# Patient Record
(demographics unavailable — no encounter records)

---

## 2024-11-25 NOTE — CURRENT MEDS
[Adherentt to medications as prescribed] : the patient is adherent to medications as prescribed [Adherent to medications] : Patient is adherent to medications as prescribed [Medication and Allergies Reconciled] : medication and allergies reconciled [High Risk Medications Reviewed and Reconciled (Beers Criteria)] : high risk medications reviewed, reconciled [Reviewed patient reported medication adherence from Comprehensive Assessment] : reviewed patient reported medication adherence from comprehensive assessment

## 2024-11-25 NOTE — HEALTH RISK ASSESSMENT
[HRA Reviewed] : Health risk assessment reviewed [Independent] : managing finances [Some assistance needed] : using transportation [No falls in past year] : Patient reported no falls in the past year [No] : The patient does not have visual impairment [TimeGetUpGo] : 0 [de-identified] : did not perform, was sitting in bed [FreeTextEntry8] : use rollator outside [[facility]] : 0 [FreeTextEntry2] : did not perform, was sitting in bed

## 2024-11-25 NOTE — ADDENDUM
[FreeTextEntry1] :   Fauzia CORDERO assisted in documentation on 11/25/2024 acting as a scribe for JOANNE Kilgore.

## 2024-11-25 NOTE — CHRONIC CARE ASSESSMENT
[3 or More Times Per Week] : exercises 3 or more times per week [General Adherence] : and is generally adherent [PPS Score: ____] : Palliative Performance Scale (PPS) Score: [unfilled] [Current] : Current Pain: [Oriented To Person] : ~L oriented to person [Oriented To Place] : ~L oriented to place [Oriented To Time] : ~L oriented to time [Oriented To Situation] : ~L oriented to situation [Alert] : ~L alert [No Action Needed] : No action needed [Reviewed depression screen from comprehensive assessment] : Reviewed depression screen from comprehensive assessment [Reviewed Home Safety Evaluation] : Reviewed home safety evaluation [Walking] : walking [Diabetic Diet] : diabetic [Low Fat Diet] : low fat [Low Carb Diet] : low carbohydrate [Low Salt Diet] : low salt [de-identified] : Low sodium, low fat, no concentrated sweets, diabetic, low carb, renal diet [FreeTextEntry1] : renal diet [Disorientation] : no disorientation was observed [Non-responsive] : responsiveness  [Lethargic] : not lethargic [Over the Past 2 Weeks, Have You Felt Down, Depressed, or Hopeless?] : 1.) Over the past 2 weeks, have you felt down, depressed, or hopeless? No [Over the Past 2 Weeks, Have You Felt Little Interest or Pleasure Doing Things?] : 2.) Over the past 2 weeks, have you felt little interest or pleasure doing things? No [Reports changes in hearing] : reports no changes in hearing [Reports changes in vision] : Reports no changes in vision [de-identified] : 0/10 [de-identified] : Extensive cardiac history (A-fib, CHFpEF, PPM, Bradycardia, cardiac ablation), CKD stage 3, DM2, liver cirrhosis, Right Breast Cancer, Osteoarthritis, [de-identified] : walks daily [de-identified] : Low sodium, low fat, no concentrated sweets, diabetic, low carb, renal diet [de-identified] : renal diet

## 2024-11-25 NOTE — PHYSICAL EXAM
[Normal S1, S2] : normal S1 and S2 [No Acute Distress] : no acute distress [Normal Voice/Communication] : normal voice communication [Normal Sclera/Conjunctiva] : normal sclera/conjunctiva [EOMI] : extra ocular movement intact [Normal Outer Ear/Nose] : the ears and nose were normal in appearance [Normal TMs] : both tympanic membranes were normal [No JVD] : no jugular venous distention [No LAD] : no lymphadenopathy [No Respiratory Distress] : no respiratory distress [Clear to Auscultation] : lungs were clear to auscultation bilaterally [No Accessory Muscle Use] : no accessory muscle use [Normal Rate] : heart rate was normal  [No Murmurs] : no murmurs heard [No Edema] : there was no peripheral edema [Normal Bowel Sounds] : normal bowel sounds [Non Tender] : non-tender [Soft] : abdomen soft [Not Distended] : not distended [Normal Post Cervical Nodes] : no posterior cervical lymphadenopathy [Normal Anterior Cervical Nodes] : no anterior cervical lymphadenopathy [Scoliosis] : scoliosis not present [No Joint Swelling] : no joint swelling seen [No Rash] : no rash [No Skin Lesions] : no skin lesions [Cranial Nerves Intact] : cranial nerves 2-12 were intact [Oriented x3] : oriented to person, place, and time [Over the Past 2 Weeks, Have You Felt Down, Depressed, or Hopeless?] : 1.) Over the past 2 weeks, have you felt down, depressed, or hopeless? No [Over the Past 2 Weeks, Have You Felt Little Interest or Pleasure Doing Things?] : 2.) Over the past 2 weeks, have you felt little interest or pleasure doing things? No [Foot Ulcers] : no foot ulcers [de-identified] : obese

## 2024-11-25 NOTE — CHRONIC CARE ASSESSMENT
[3 or More Times Per Week] : exercises 3 or more times per week [General Adherence] : and is generally adherent [PPS Score: ____] : Palliative Performance Scale (PPS) Score: [unfilled] [Current] : Current Pain: [Oriented To Person] : ~L oriented to person [Oriented To Place] : ~L oriented to place [Oriented To Time] : ~L oriented to time [Oriented To Situation] : ~L oriented to situation [Alert] : ~L alert [No Action Needed] : No action needed [Reviewed depression screen from comprehensive assessment] : Reviewed depression screen from comprehensive assessment [Reviewed Home Safety Evaluation] : Reviewed home safety evaluation [Walking] : walking [Diabetic Diet] : diabetic [Low Fat Diet] : low fat [Low Carb Diet] : low carbohydrate [Low Salt Diet] : low salt [de-identified] : Low sodium, low fat, no concentrated sweets, diabetic, low carb, renal diet [FreeTextEntry1] : renal diet [Disorientation] : no disorientation was observed [Non-responsive] : responsiveness  [Lethargic] : not lethargic [Over the Past 2 Weeks, Have You Felt Down, Depressed, or Hopeless?] : 1.) Over the past 2 weeks, have you felt down, depressed, or hopeless? No [Over the Past 2 Weeks, Have You Felt Little Interest or Pleasure Doing Things?] : 2.) Over the past 2 weeks, have you felt little interest or pleasure doing things? No [Reports changes in hearing] : reports no changes in hearing [Reports changes in vision] : Reports no changes in vision [de-identified] : 0/10 [de-identified] : Extensive cardiac history (A-fib, CHFpEF, PPM, Bradycardia, cardiac ablation), CKD stage 3, DM2, liver cirrhosis, Right Breast Cancer, Osteoarthritis, [de-identified] : walks daily [de-identified] : Low sodium, low fat, no concentrated sweets, diabetic, low carb, renal diet [de-identified] : renal diet

## 2024-11-25 NOTE — REVIEW OF SYSTEMS
[Joint Pain] : joint pain [Joint Stiffness] : no joint stiffness [Joint Swelling] : no joint swelling [Muscle Weakness] : no muscle weakness [Muscle Pain] : no muscle pain [Back Pain] : back pain [Headache] : no headache [Dizziness] : no dizziness [Fainting] : no fainting [Confusion] : no confusion [Memory Loss] : no memory loss [Unsteady Walking] : ataxia [Suicidal] : not suicidal [Insomnia] : insomnia [Anxiety] : no anxiety [Depression] : no depression [Negative] : Heme/Lymph [FreeTextEntry9] : lower back pain and right knee pain [de-identified] : chronic insomnia

## 2024-11-25 NOTE — HEALTH RISK ASSESSMENT
[HRA Reviewed] : Health risk assessment reviewed [Independent] : managing finances [Some assistance needed] : using transportation [No falls in past year] : Patient reported no falls in the past year [No] : The patient does not have visual impairment [TimeGetUpGo] : 0 [de-identified] : did not perform, was sitting in bed [FreeTextEntry8] : use rollator outside [facility] : 0 [FreeTextEntry2] : did not perform, was sitting in bed

## 2024-11-25 NOTE — REVIEW OF SYSTEMS
[Joint Pain] : joint pain [Joint Stiffness] : no joint stiffness [Joint Swelling] : no joint swelling [Muscle Weakness] : no muscle weakness [Muscle Pain] : no muscle pain [Back Pain] : back pain [Headache] : no headache [Dizziness] : no dizziness [Fainting] : no fainting [Confusion] : no confusion [Memory Loss] : no memory loss [Unsteady Walking] : ataxia [Suicidal] : not suicidal [Insomnia] : insomnia [Anxiety] : no anxiety [Depression] : no depression [Negative] : Heme/Lymph [FreeTextEntry9] : lower back pain and right knee pain [de-identified] : chronic insomnia

## 2024-11-25 NOTE — HISTORY OF PRESENT ILLNESS
[In-Place] : has aide services in-place [Family Member] : family member [Patient] : patient [[employer] Co-Management Patient] : [unfilled] is a House [employer]s co-management patient [Patient is stable] : patient is stable [Education provided] : education provided [LastPCPVisitDate] : 05/24 [FreeTextEntry4] : cardiology, endocrinology, GI, nephrology, oncology [FreeTextEntry1] : Not homebound [FreeTextEntry2] : 11/25/2024 COVID SCREEN: Patient or caretaker denies fever, cough, trouble breathing, rash, vomiting. Patient has not been in close contact with anyone who is COVID-19 positive, or suspected of having COVID-19.  N95 mask, gloves, eye wear and gown (if indicated) used during visit: Yes.  Total face to face time with patient is 45 min.  HARSHA MILES is an 77 year with Hypertension, Type 2 Diabetes, Dyslipidemia, Atrial Fibrillation, CHFpEF, CKD stage 3,  Liver Cirrhosis, Carpal tunnel, PPM, Insomnia, Cardiac ablation, Right Breast Cancer, Osteoarthritis, bradycardia, hypotension seen today for  hospital discharge  home visit.  Patient attended the visit with caregiver  Today's Visit and Review 11/25/2024:  - Seen today for post-discharge follow-up, doing much better overall.   - VNS contacted pt during visit for assessment post discharge - Recent hospitalization for palpitations, chest pain. Newfound Dx of gout.   - Denies any recurrence of palpitations or chest pain since discharge.   - Scheduled for liver biopsy at hospital 11/25/2024 at 3pm - Scheduled with heart clinic 12/03/2024.  - Sleeping well, using mirtazzpine  - Denies any pain.  - Yet to receive two-day Prednisone 8mg prescription. Advised to contact pharmacy and start medication.  - Yet to receive flu vaccination. Plan to receive with PCP at hospital.    Geriatric Assessment:  -Appetite/weight: Unchanged from prior   -Gait/falls: No falls reported   -Pain: None reported   -Sleep: Sleeping well with sleeping aid -BMs: Unchanged from prior   -Urine: Unchanged from prior   -Skin: Unchanged from prior  -DME: cane, rollator  -Mood/memory: Good   Communication: Good    Hospitalization:  # 11/15/24 [facility] for chest pain, palpitation, gout arthritis. Patient was discharge home on 11/21/24. #Admission to [facility] ([facility]) on 12/26 for hypotension, cough. Primary Dx bradycardia, DESMOND with COVID-19.   Patient/ patient's caregiver reports no weight loss >10 lbs in the past 6 months. No changes in dentition or swallowing reported, No changes in hearing or vision reported. No changes in Cognition reported. Patient denies any symptoms of depression or anxiety. Patient is ADL independent/dependent and IADL independent/dependent. No changes in gait or falls reported.   Patient's home environment is safe.   Goals of care discussed 10min- FULL CODE, no limitation

## 2024-11-25 NOTE — END OF VISIT
[FreeTextEntry3] : All medical record entries made by my scribe were at my, JOANNE Davis Nsia, direction and personally dictated by me. I have reviewed the chart and agree that the record accurately reflects my personal performance of the history, physical exam, assessment and plan. I have also personally directed, reviewed, and agreed with the chart.

## 2024-11-25 NOTE — REASON FOR VISIT
[Follow-Up] : a follow-up visit [Family Member] : family member [Other: _____] : [unfilled] [Post-hospitalization from ___ Hospital] : Post-hospitalization from [unfilled] Hospital [Admitted on: ___] : The patient was admitted on [unfilled] [Discharged on ___] : discharged on [unfilled] [Discharge Summary] : discharge summary [Discharge Med List] : discharge medication list [Med Reconciliation] : medication reconciliation has been completed [Patient Contacted By: ____] : and contacted by [unfilled] [Post Hospitalization] : a post hospitalization visit [Formal Caregiver] : formal caregiver [Pre-Visit Preparation] : pre-visit preparation was done [Intercurrent Specialty/Sub-specialty Visits] : the patient has intercurrent specialty/sub-specialty visits [FreeTextEntry4] : chart review [FreeTextEntry5] : cardiology, endocrinology, GI, oncology, nephrology [FreeTextEntry1] : Type 2 Diabetes, Atrial Fibrillation, CHFpEF, CKD stage 3,  Liver Cirrhosis, PPM, Right Breast Cancer, Osteoarthritis,            [FreeTextEntry2] : chart reviewed [FreeTextEntry3] : cardiology, endocrinology, GI, oncology, nephrology

## 2024-11-25 NOTE — HISTORY OF PRESENT ILLNESS
[In-Place] : has aide services in-place [Family Member] : family member [Patient] : patient [House Calls Co-Management Patient] : [unfilled] is a House Calls co-management patient [Patient is stable] : patient is stable [Education provided] : education provided [LastPCPVisitDate] : 05/24 [FreeTextEntry4] : cardiology, endocrinology, GI, nephrology, oncology [FreeTextEntry1] : Not homebound [FreeTextEntry2] : 11/25/2024 COVID SCREEN: Patient or caretaker denies fever, cough, trouble breathing, rash, vomiting. Patient has not been in close contact with anyone who is COVID-19 positive, or suspected of having COVID-19.  N95 mask, gloves, eye wear and gown (if indicated) used during visit: Yes.  Total face to face time with patient is 45 min.  HARSHA MILES is an 77 year with Hypertension, Type 2 Diabetes, Dyslipidemia, Atrial Fibrillation, CHFpEF, CKD stage 3,  Liver Cirrhosis, Carpal tunnel, PPM, Insomnia, Cardiac ablation, Right Breast Cancer, Osteoarthritis, bradycardia, hypotension seen today for  hospital discharge  home visit.  Patient attended the visit with caregiver  Today's Visit and Review 11/25/2024:  - Seen today for post-discharge follow-up, doing much better overall.   - VNS contacted pt during visit for assessment post discharge - Recent hospitalization for palpitations, chest pain. Newfound Dx of gout.   - Denies any recurrence of palpitations or chest pain since discharge.   - Scheduled for liver biopsy at hospital 11/25/2024 at 3pm - Scheduled with heart clinic 12/03/2024.  - Sleeping well, using mirtazzpine  - Denies any pain.  - Yet to receive two-day Prednisone 8mg prescription. Advised to contact pharmacy and start medication.  - Yet to receive flu vaccination. Plan to receive with PCP at hospital.    Geriatric Assessment:  -Appetite/weight: Unchanged from prior   -Gait/falls: No falls reported   -Pain: None reported   -Sleep: Sleeping well with sleeping aid -BMs: Unchanged from prior   -Urine: Unchanged from prior   -Skin: Unchanged from prior  -DME: cane, rollator  -Mood/memory: Good   Communication: Good    Hospitalization:  # 11/15/24 [facility] for chest pain, palpitation, gout arthritis. Patient was discharge home on 11/21/24. #[facility] on 12/26 for hypotension, cough. Primary Dx bradycardia, DESMOND with COVID-19.   Patient/ patient's caregiver reports no weight loss >10 lbs in the past 6 months. No changes in dentition or swallowing reported, No changes in hearing or vision reported. No changes in Cognition reported. Patient denies any symptoms of depression or anxiety. Patient is ADL independent/dependent and IADL independent/dependent. No changes in gait or falls reported.   Patient's home environment is safe.   Goals of care discussed 10min- FULL CODE, no limitation

## 2024-11-25 NOTE — COUNSELING
[Obese -  ( BMI  >29.9 )] : obese - ( BMI  >29.9 ) [] : foot exam [Not Recommended] : Aspirin use not recommended due to overall prognosis [Improve mobility] : improve mobility [Improve weight] : improve weight [Full Code] : Code Status: Full Code [No Limitations] : Treatment Guidelines: No limitations [Long Term Intubation] : Intubation: Long term intubation [DASH diet recommended] : DASH diet recommended [Patient not on disease-modifying anti-rheumatic drug due to overall prognosis] : Patient not on disease-modifying anti-rheumatic drug due to overall prognosis [Obese (BMI >29.9)] : Obese - BMI >29.9 [Weight counseling provided] : Weight counseling provided [DASH diet given] : DASH diet given [Sodium restriction 2gm recommended] : Sodium restriction 2 gm recommended [Non - Smoker] : non-smoker [Medical alert] : medical alert [Use assistive device to avoid falls] : use assistive device to avoid falls [Remove clutter and unsafe carpeting to avoid falls] : remove clutter and unsafe carpeting to avoid falls [Use grab bars] : use grab bars [Smoke/CO Detectors] : smoke/CO detectors [Bone Density] : Bone Density [FreeTextEntry3] : renal diet [Improve pain control] : improve pain control [Minimize unnecessary interventions] : minimize unnecessary interventions [Discussed disease trajectory with patient/caregiver] : discussed disease trajectory with patient/caregiver

## 2024-11-25 NOTE — PHYSICAL EXAM
[Normal S1, S2] : normal S1 and S2 [No Acute Distress] : no acute distress [Normal Voice/Communication] : normal voice communication [Normal Sclera/Conjunctiva] : normal sclera/conjunctiva [EOMI] : extra ocular movement intact [Normal Outer Ear/Nose] : the ears and nose were normal in appearance [Normal TMs] : both tympanic membranes were normal [No JVD] : no jugular venous distention [No LAD] : no lymphadenopathy [No Respiratory Distress] : no respiratory distress [Clear to Auscultation] : lungs were clear to auscultation bilaterally [No Accessory Muscle Use] : no accessory muscle use [Normal Rate] : heart rate was normal  [No Murmurs] : no murmurs heard [No Edema] : there was no peripheral edema [Normal Bowel Sounds] : normal bowel sounds [Non Tender] : non-tender [Soft] : abdomen soft [Not Distended] : not distended [Normal Post Cervical Nodes] : no posterior cervical lymphadenopathy [Normal Anterior Cervical Nodes] : no anterior cervical lymphadenopathy [Scoliosis] : scoliosis not present [No Joint Swelling] : no joint swelling seen [No Rash] : no rash [No Skin Lesions] : no skin lesions [Cranial Nerves Intact] : cranial nerves 2-12 were intact [Oriented x3] : oriented to person, place, and time [Over the Past 2 Weeks, Have You Felt Down, Depressed, or Hopeless?] : 1.) Over the past 2 weeks, have you felt down, depressed, or hopeless? No [Over the Past 2 Weeks, Have You Felt Little Interest or Pleasure Doing Things?] : 2.) Over the past 2 weeks, have you felt little interest or pleasure doing things? No [Foot Ulcers] : no foot ulcers [de-identified] : obese

## 2024-12-18 NOTE — PHYSICAL EXAM
[General Appearance - Alert] : alert [Strabismus] : no strabismus was seen [Hearing Threshold Finger Rub Not [place]] : hearing was normal [Neck Cervical Mass (___cm)] : no neck mass was observed [Jugular Venous Distention Increased] : there was no jugular-venous distention [Exaggerated Use Of Accessory Muscles For Inspiration] : no accessory muscle use [Auscultation Breath Sounds / Voice Sounds] : lungs were clear to auscultation bilaterally [Heart Sounds] : normal S1 and S2 [Heart Sounds Gallop] : no gallops [Murmurs] : no murmurs [Edema] : there was no peripheral edema [Bowel Sounds] : normal bowel sounds [Abdomen Soft] : soft [No CVA Tenderness] : no ~M costovertebral angle tenderness [No Focal Deficits] : no focal deficits [Oriented To Time, Place, And Person] : oriented to person, place, and time

## 2024-12-18 NOTE — PHYSICAL EXAM
[General Appearance - Alert] : alert [Strabismus] : no strabismus was seen [Hearing Threshold Finger Rub Not [place]] : hearing was normal [Neck Cervical Mass (___cm)] : no neck mass was observed [Jugular Venous Distention Increased] : there was no jugular-venous distention [Exaggerated Use Of Accessory Muscles For Inspiration] : no accessory muscle use [Auscultation Breath Sounds / Voice Sounds] : lungs were clear to auscultation bilaterally [Heart Sounds] : normal S1 and S2 [Murmurs] : no murmurs [Heart Sounds Gallop] : no gallops [Edema] : there was no peripheral edema [Bowel Sounds] : normal bowel sounds [Abdomen Soft] : soft [No CVA Tenderness] : no ~M costovertebral angle tenderness [No Focal Deficits] : no focal deficits [Oriented To Time, Place, And Person] : oriented to person, place, and time

## 2024-12-19 NOTE — HISTORY OF PRESENT ILLNESS
[FreeTextEntry1] : 77y F w/ hx of HTN, DM, CKD3, A-fib, former smoker 1 ppd for 35years and CHF here for follow up.   Hospitalized at [facility] Nov 15-21 for CHF.  defibrillator adjusted, ramipril exchanged for Entresto, furosemide doubled to 80mg daily.  Feeling better now, no significant complaints.    Ambulation limited, with knee brace for severe arthritis and wheelchair bound.  Chronic pain - no relief with tylenol. avoiding NSAIDs. going for liver ablation tomorrow? h/o cirrhosis. denies CP, palpitaions, SOB, N/V/D, dizziness, lightheadedness, dysuria, hematuria, fever or chills.   Previous labs 6/10/24: SCr at 1.05 with GFR at 55, eGFR by cystatin C at 39. No electrolyte imbalances noted.   PCP: Phil Maria

## 2024-12-19 NOTE — ASSESSMENT
[FreeTextEntry1] : reviewed home draw labs from 12/10/24  1. CKD stage III likely related to HTN associated nephrosclerosis vs DM vs CRS vs recurrent episodes of DESMOND - New labs  SCr 0.92 and GFR at 64, eGFR by cystatin C at 44. No proteinuria.  Continue maintaining strict BP control and DM control. Avoiding NSAIDs.  To obtain baseline renal US.  2. HTN- BP well controlled, possibly too low however pt asymptomatic and renal function good.   She is to discuss her medication regimen and BP with her cardiologist.  c/w low salt diet and regular home BP monitoring May need to decrease furosemide dosing if signs of volume depletion develop  3. DM - strictly diet controlled.  already on SGLT2i (Farxiga) 10mg PO daily  f/u 6 months - scheduling home draw prior

## 2024-12-19 NOTE — HISTORY OF PRESENT ILLNESS
[FreeTextEntry1] : 77y F w/ hx of HTN, DM, CKD3, A-fib, former smoker 1 ppd for 35years and CHF here for follow up.   Hospitalized at [facility] Nov 15-21 for CHF.  defibrillator adjusted, ramipril exchanged for Entresto, furosemide doubled to 80mg daily.  Feeling better now, no significant complaints.    Ambulation limited, with knee brace for severe arthritis and wheelchair bound.  Chronic pain - no relief with tylenol. avoiding NSAIDs. going for liver ablation tomorrow? h/o cirrhosis. denies CP, palpitaions, SOB, N/V/D, dizziness, lightheadedness, dysuria, hematuria, fever or chills.   Previous labs 6/10/24: SCr at 1.05 with GFR at 55, eGFR by cystatin C at 39. No electrolyte imbalances noted.   PCP: Phil Maria

## 2025-01-07 NOTE — CHRONIC CARE ASSESSMENT
[3 or More Times Per Week] : exercises 3 or more times per week [General Adherence] : and is generally adherent [de-identified] : renal diet [___ Times Per Week] : exercises [unfilled] times per week [Walking] : walking [Diabetic Diet] : diabetic [Low Fat Diet] : low fat [Low Carb Diet] : low carbohydrate [Low Salt Diet] : low salt [Current] : Current Pain: [Oriented To Person] : ~L oriented to person [Oriented To Place] : ~L oriented to place [Oriented To Time] : ~L oriented to time [Oriented To Situation] : ~L oriented to situation [Alert] : ~L alert [No Action Needed] : No action needed [Reviewed depression screen from comprehensive assessment] : Reviewed depression screen from comprehensive assessment [Reviewed Home Safety Evaluation] : Reviewed home safety evaluation [PPS Score: ____] : Palliative Performance Scale (PPS) Score: [unfilled] [de-identified] : Extensive cardiac history, CKD, DM2, liver cirrhosis  [de-identified] : walks daily with cane in house and use wheelchair in community [de-identified] : Low sodium, low fat, no concentrated sweets, diabetic, low carb, renal diet [FreeTextEntry1] : renal diet [Disorientation] : no disorientation was observed [Non-responsive] : responsiveness  [Lethargic] : not lethargic [Stupor] : no stupor [Over the Past 2 Weeks, Have You Felt Down, Depressed, or Hopeless?] : 1.) Over the past 2 weeks, have you felt down, depressed, or hopeless? No [Over the Past 2 Weeks, Have You Felt Little Interest or Pleasure Doing Things?] : 2.) Over the past 2 weeks, have you felt little interest or pleasure doing things? No [Reports changes in hearing] : reports no changes in hearing [Reports changes in vision] : Reports no changes in vision [de-identified] : 1/10

## 2025-01-07 NOTE — PHYSICAL EXAM
[No Skin Lesions] : no skin lesions [No Acute Distress] : no acute distress [Normal Voice/Communication] : normal voice communication [Normal Sclera/Conjunctiva] : normal sclera/conjunctiva [EOMI] : extra ocular movement intact [Normal Outer Ear/Nose] : the ears and nose were normal in appearance [Normal TMs] : both tympanic membranes were normal [No JVD] : no jugular venous distention [No LAD] : no lymphadenopathy [No Respiratory Distress] : no respiratory distress [Clear to Auscultation] : lungs were clear to auscultation bilaterally [No Accessory Muscle Use] : no accessory muscle use [Normal Rate] : heart rate was normal  [Normal S1, S2] : normal S1 and S2 [No Murmurs] : no murmurs heard [No Edema] : there was no peripheral edema [Normal Bowel Sounds] : normal bowel sounds [Non Tender] : non-tender [Soft] : abdomen soft [Not Distended] : not distended [Normal Post Cervical Nodes] : no posterior cervical lymphadenopathy [Normal Anterior Cervical Nodes] : no anterior cervical lymphadenopathy [No Joint Swelling] : no joint swelling seen [No Rash] : no rash [Cranial Nerves Intact] : cranial nerves 2-12 were intact [Oriented x3] : oriented to person, place, and time [Over the Past 2 Weeks, Have You Felt Down, Depressed, or Hopeless?] : 1.) Over the past 2 weeks, have you felt down, depressed, or hopeless? No [Over the Past 2 Weeks, Have You Felt Little Interest or Pleasure Doing Things?] : 2.) Over the past 2 weeks, have you felt little interest or pleasure doing things? No [Foot Ulcers] : no foot ulcers [de-identified] : using glasses [de-identified] : obese

## 2025-01-07 NOTE — HISTORY OF PRESENT ILLNESS
[In-Place] : has aide services in-place [Family Member] : family member [House Calls Co-Management Patient] : [unfilled] is a House Calls co-management patient [Patient is stable] : patient is stable [Education provided] : education provided [Patient] : patient [LastPVisitDate] : 12/24 [FreeTextEntry4] : cardiology, endocrinology, GI, nephrology, oncology [FreeTextEntry1] : OA, chronic pain syndrome [FreeTextEntry2] : 01/07/2025 COVID SCREEN: Patient or caretaker denies fever, cough, trouble breathing, rash, vomiting. Patient has not been in close contact with anyone who is COVID-19 positive, or suspected of having COVID-19.  N95 mask, gloves, eye wear and gown (if indicated) used during visit: Yes.  Total face to face time with patient is 45 min.  HARSHA MILES is an 77 year with Hypertension, Type 2 Diabetes, Dyslipidemia, Atrial Fibrillation, CHFpEF, CKD stage 3,  Liver Cirrhosis, Carpal tunnel, PPM, Insomnia, Cardiac ablation, Right Breast Cancer, Osteoarthritis, bradycardia, hypotension, s/p renal biopsy and renal ablation seen today for follow up home visit.  Accompanied the visit was HHA  HARSHA MILES  reported of non alert recent hospitalization at [facility] on 12/31/24 for complication s/p liver biopsy and ablation. HARSHA MILES  was discharge home on 01/03/25  Discharge medications were reconciled with current medication list. start -benzonatate 100mg three times daily as needed -docusate 100mg three times a day -Percocet 5-325 mg tab ever four hours as needed for 7days -levofloxacin 250mg every 24hrs for 5days -Robafen conge 100mg/5ml take 10ml every four hours as needed  Visit and Review 01/07/2025: - The patient underwent a liver biopsy and ablation on December 31st. - Complications arose post-procedure, leading to an extended hospital stay until January 3rd. - The patient experienced difficulty breathing and throat pain upon waking from the procedure, requiring ICU care. - Currently, the patient reports feeling tired and is recuperating at home. - Pain is present but managed with pain medication. - The patient reports a decrease in appetite but is slowly improving. - The patient experienced significant throat pain and difficulty breathing post-procedure. - The voice has not fully returned, and the patient is advised to avoid cold drinks. - The patient experienced palpitations last night, which have since improved. - The patient is taking oxycodone for pain and has been prescribed Levofloxacin (Levoquin) 250 mg for five days. - The patient is also taking Pantoprazole for heartburn and Colace to prevent constipation. - The patient is unsure about the need for medication refills.  Hospitalization:    -[facility] admission on 12/31 for complications status-post liver biopsy and ablation. Discharged home on 01/03/25 # 11/15/24 [facility] for chest pain, palpitation, gout arthritis. Patient was discharge home on 11/21/24. #Admission to [facility] on 12/26 for hypotension, cough. Primary Dx bradycardia, DESMOND with COVID-19.  Patient/ patient's caregiver reports no weight loss >10 lbs in the past 6 months. No changes in dentition or swallowing reported, No changes in hearing or vision reported. No changes in Cognition reported. Patient denies any symptoms of depression or anxiety. Patient is ADL independent/dependent and IADL independent/dependent. No changes in gait or falls reported.   Patient's home environment is safe.   Goals of care discussed 10min- FULL CODE, no limitation

## 2025-01-07 NOTE — REVIEW OF SYSTEMS
[Back Pain] : back pain [Unsteady Walking] : ataxia [Insomnia] : insomnia [Negative] : Heme/Lymph [Joint Pain] : no joint pain [Joint Stiffness] : no joint stiffness [Joint Swelling] : no joint swelling [Muscle Weakness] : no muscle weakness [Muscle Pain] : no muscle pain [Itching] : no itching [Mole Changes] : no mole changes [Nail Changes] : no nail changes [Hair Changes] : no hair changes [Skin Rash] : no skin rash [Suicidal] : not suicidal [Anxiety] : no anxiety [Depression] : no depression [FreeTextEntry9] : lower back pain [de-identified] : incision site dry and clean on RUQ [de-identified] : severe insomnia

## 2025-01-07 NOTE — HISTORY OF PRESENT ILLNESS
[In-Place] : has aide services in-place [Family Member] : family member [House Calls Co-Management Patient] : [unfilled] is a House Calls co-management patient [Patient is stable] : patient is stable [Education provided] : education provided [Patient] : patient [LastPVisitDate] : 12/24 [FreeTextEntry4] : cardiology, endocrinology, GI, nephrology, oncology [FreeTextEntry1] : OA, chronic pain syndrome [FreeTextEntry2] : 01/07/2025 COVID SCREEN: Patient or caretaker denies fever, cough, trouble breathing, rash, vomiting. Patient has not been in close contact with anyone who is COVID-19 positive, or suspected of having COVID-19.  N95 mask, gloves, eye wear and gown (if indicated) used during visit: Yes.  Total face to face time with patient is 45 min.  HARSHA MILES is an 77 year with Hypertension, Type 2 Diabetes, Dyslipidemia, Atrial Fibrillation, CHFpEF, CKD stage 3,  Liver Cirrhosis, Carpal tunnel, PPM, Insomnia, Cardiac ablation, Right Breast Cancer, Osteoarthritis, bradycardia, hypotension, s/p renal biopsy and renal ablation seen today for follow up home visit.  Accompanied the visit was HHA  HARSHA MILES  reported of non alert recent hospitalization at [facility] on 12/31/24 for complication s/p liver biopsy and ablation. HARSHA MILES  was discharge home on 01/03/25  Discharge medications were reconciled with current medication list. start -benzonatate 100mg three times daily as needed -docusate 100mg three times a day -Percocet 5-325 mg tab ever four hours as needed for 7days -levofloxacin 250mg every 24hrs for 5days -Robafen conge 100mg/5ml take 10ml every four hours as needed  Visit and Review 01/07/2025: - The patient underwent a liver biopsy and ablation on December 31st. - Complications arose post-procedure, leading to an extended hospital stay until January 3rd. - The patient experienced difficulty breathing and throat pain upon waking from the procedure, requiring ICU care. - Currently, the patient reports feeling tired and is recuperating at home. - Pain is present but managed with pain medication. - The patient reports a decrease in appetite but is slowly improving. - The patient experienced significant throat pain and difficulty breathing post-procedure. - The voice has not fully returned, and the patient is advised to avoid cold drinks. - The patient experienced palpitations last night, which have since improved. - The patient is taking oxycodone for pain and has been prescribed Levofloxacin (Levoquin) 250 mg for five days. - The patient is also taking Pantoprazole for heartburn and Colace to prevent constipation. - The patient is unsure about the need for medication refills.  Hospitalization:    -[facility] admission on 12/31 for complications status-post liver biopsy and ablation. Discharged home on 01/03/25 # 11/15/24 [facility] for chest pain, palpitation, gout arthritis. Patient was discharge home on 11/21/24. #Admission to [facility] ([facility]) on 12/26 for hypotension, cough. Primary Dx bradycardia, DESMOND with COVID-19.  Patient/ patient's caregiver reports no weight loss >10 lbs in the past 6 months. No changes in dentition or swallowing reported, No changes in hearing or vision reported. No changes in Cognition reported. Patient denies any symptoms of depression or anxiety. Patient is ADL independent/dependent and IADL independent/dependent. No changes in gait or falls reported.   Patient's home environment is safe.   Goals of care discussed 10min- FULL CODE, no limitation

## 2025-01-07 NOTE — HEALTH RISK ASSESSMENT
[Independent] : managing finances [Some assistance needed] : using transportation [No falls in past year] : Patient reported no falls in the past year [No] : The patient does not have visual impairment [FreeTextEntry8] : use rollator outside [[facility]] : 0 [FreeTextEntry2] : did not perform, was sitting in bed [HRA Reviewed] : Health risk assessment reviewed [TimeGetUpGo] : 0 [de-identified] : did not perform, was sitting in bed

## 2025-01-07 NOTE — END OF VISIT
[FreeTextEntry3] :   Documented by Sarah Dickens acting as a scribe for Susan Kilgore NP. 01/07/2025   All medical record entries made by the Sarah Dickens (Scribe) were at my, Susan Kilgore NP, direction and personally dictated by me on 01/07/2025. I have reviewed the chart and agree that the record accurately reflects my personal performance of the history, physical exam, assessment and plan. I have also personally directed, reviewed, and agreed with the chart.

## 2025-01-07 NOTE — CHRONIC CARE ASSESSMENT
[3 or More Times Per Week] : exercises 3 or more times per week [General Adherence] : and is generally adherent [de-identified] : renal diet [___ Times Per Week] : exercises [unfilled] times per week [Walking] : walking [Diabetic Diet] : diabetic [Low Fat Diet] : low fat [Low Carb Diet] : low carbohydrate [Low Salt Diet] : low salt [Current] : Current Pain: [Oriented To Person] : ~L oriented to person [Oriented To Place] : ~L oriented to place [Oriented To Time] : ~L oriented to time [Oriented To Situation] : ~L oriented to situation [Alert] : ~L alert [No Action Needed] : No action needed [Reviewed depression screen from comprehensive assessment] : Reviewed depression screen from comprehensive assessment [Reviewed Home Safety Evaluation] : Reviewed home safety evaluation [PPS Score: ____] : Palliative Performance Scale (PPS) Score: [unfilled] [de-identified] : Extensive cardiac history, CKD, DM2, liver cirrhosis  [de-identified] : walks daily with cane in house and use wheelchair in community [de-identified] : Low sodium, low fat, no concentrated sweets, diabetic, low carb, renal diet [FreeTextEntry1] : renal diet [Disorientation] : no disorientation was observed [Non-responsive] : responsiveness  [Lethargic] : not lethargic [Stupor] : no stupor [Over the Past 2 Weeks, Have You Felt Down, Depressed, or Hopeless?] : 1.) Over the past 2 weeks, have you felt down, depressed, or hopeless? No [Over the Past 2 Weeks, Have You Felt Little Interest or Pleasure Doing Things?] : 2.) Over the past 2 weeks, have you felt little interest or pleasure doing things? No [Reports changes in hearing] : reports no changes in hearing [Reports changes in vision] : Reports no changes in vision [de-identified] : 1/10

## 2025-01-07 NOTE — CURRENT MEDS
[Adherent to medications] : Patient is adherent to medications as prescribed [Adherentt to medications as prescribed] : the patient is adherent to medications as prescribed [Medication and Allergies Reconciled] : medication and allergies reconciled [High Risk Medications Reviewed and Reconciled (Beers Criteria)] : high risk medications reviewed and reconciled [Reviewed patient reported medication adherence from Comprehensive Assessment] : Reviewed patient reported medication adherence from comprehensive assessment

## 2025-01-07 NOTE — REASON FOR VISIT
[Follow-Up] : a follow-up visit [Family Member] : family member [Other: _____] : [unfilled] [Outbound] : Telephone Outbound [Patient] : Patient [Post-Discharge] : Post-Discharge [30] : 30 [Formal Caregiver] : formal caregiver [Pre-Visit Preparation] : pre-visit preparation was done [Intercurrent Specialty/Sub-specialty Visits] : the patient has intercurrent specialty/sub-specialty visits [FreeTextEntry1] : Type 2 Diabetes, Atrial Fibrillation, CHFpEF, CKD stage 3,  Liver Cirrhosis, PPM, Right Breast Cancer, Osteoarthritis,            [FreeTextEntry2] : chart reviewed [FreeTextEntry3] : cardiology, endocrinology, GI, oncology, nephrology

## 2025-01-07 NOTE — ADDENDUM
[FreeTextEntry1] :   Documented by Sarah Dickens acting as a scribe for Susan Kilgore, JOANNE. 01/07/2025

## 2025-01-07 NOTE — COUNSELING
[] : foot exam [Not Recommended] : Aspirin use not recommended due to overall prognosis [Improve weight] : improve weight [Improve pain control] : improve pain control [Full Code] : Code Status: Full Code [No Limitations] : Treatment Guidelines: No limitations [Long Term Intubation] : Intubation: Long term intubation [Obese (BMI >29.9)] : Obese - BMI >29.9 [Weight counseling provided] : Weight counseling provided [DASH diet given] : DASH diet given [Obese -  ( BMI  >29.9 )] : obese - ( BMI  >29.9 ) [DASH diet recommended] : DASH diet recommended [Sodium restriction 2gm recommended] : sodium restriction 2 gm recommended [Non - Smoker] : non-smoker [Smoke/CO Detectors] : smoke/CO detectors [Use grab bars] : use grab bars [Medical alert] : medical alert [Use assistive device to avoid falls] : use assistive device to avoid falls [Remove clutter and unsafe carpeting to avoid falls] : remove clutter and unsafe carpeting to avoid falls [Patient not on disease-modifying anti-rheumatic drug due to overall prognosis] : Patient not on disease-modifying anti-rheumatic drug due to overall prognosis [Improve mobility] : improve mobility [Decrease hospital use] : decrease hospital use [Minimize unnecessary interventions] : minimize unnecessary interventions [Discussed disease trajectory with patient/caregiver] : discussed disease trajectory with patient/caregiver [Advanced Directives discussed: ____] : Advanced directives discussed: [unfilled] [Annual Discussion and review of: ___] : Annual discussion and review of [unfilled] [FreeTextEntry3] : renal diet

## 2025-01-07 NOTE — PHYSICAL EXAM
[No Skin Lesions] : no skin lesions [No Acute Distress] : no acute distress [Normal Voice/Communication] : normal voice communication [Normal Sclera/Conjunctiva] : normal sclera/conjunctiva [EOMI] : extra ocular movement intact [Normal Outer Ear/Nose] : the ears and nose were normal in appearance [Normal TMs] : both tympanic membranes were normal [No JVD] : no jugular venous distention [No LAD] : no lymphadenopathy [No Respiratory Distress] : no respiratory distress [Clear to Auscultation] : lungs were clear to auscultation bilaterally [No Accessory Muscle Use] : no accessory muscle use [Normal Rate] : heart rate was normal  [Normal S1, S2] : normal S1 and S2 [No Murmurs] : no murmurs heard [No Edema] : there was no peripheral edema [Normal Bowel Sounds] : normal bowel sounds [Non Tender] : non-tender [Soft] : abdomen soft [Not Distended] : not distended [Normal Post Cervical Nodes] : no posterior cervical lymphadenopathy [Normal Anterior Cervical Nodes] : no anterior cervical lymphadenopathy [No Joint Swelling] : no joint swelling seen [No Rash] : no rash [Cranial Nerves Intact] : cranial nerves 2-12 were intact [Oriented x3] : oriented to person, place, and time [Over the Past 2 Weeks, Have You Felt Down, Depressed, or Hopeless?] : 1.) Over the past 2 weeks, have you felt down, depressed, or hopeless? No [Over the Past 2 Weeks, Have You Felt Little Interest or Pleasure Doing Things?] : 2.) Over the past 2 weeks, have you felt little interest or pleasure doing things? No [Foot Ulcers] : no foot ulcers [de-identified] : using glasses [de-identified] : obese

## 2025-01-07 NOTE — HEALTH RISK ASSESSMENT
[Independent] : managing finances [Some assistance needed] : using transportation [No falls in past year] : Patient reported no falls in the past year [No] : The patient does not have visual impairment [FreeTextEntry8] : use rollator outside [[facility]] : 0 [FreeTextEntry2] : did not perform, was sitting in bed [HRA Reviewed] : Health risk assessment reviewed [TimeGetUpGo] : 0 [de-identified] : did not perform, was sitting in bed

## 2025-01-07 NOTE — DISCUSSION/SUMMARY
[Date of Call: ____] : Date of Call: [unfilled] [Patient is stable] : patient is stable [Education provided] : education provided [New treatment plan] : new treatment plan [FreeTextEntry1] : HARSHA MILES is an 77 year with Hypertension, Type 2 Diabetes, Dyslipidemia, Atrial Fibrillation, CHFpEF, CKD stage 3, Liver Cirrhosis, Carpal tunnel, PPM, Insomnia, Cardiac ablation, Right Breast Cancer, Osteoarthritis, bradycardia, hypotension  HARSHA MILES  is being seen after discharge home from [facility] for chest pain, palpitation, gout arthritis. HARSHA MILES  was admitted on 11/15/24  and was discharge home on 11/21/24 .   Discharge medications were reconciled with current medication list. Stop: -Ramipril 5mg daily  Start: -Methylprednisolone 8mg tab, take 2 tabs daily for 3days -Methylprednisolone 8mg tab, take 2 tabs daily for 2days Patient reports Methylprednisolone 8mgs for 2 days was not send to pharmacy by hospital. Medication order send to pharmacy.

## 2025-01-07 NOTE — DISCUSSION/SUMMARY
[Date of Call: ____] : Date of Call: [unfilled] [Patient is stable] : patient is stable [Education provided] : education provided [New treatment plan] : new treatment plan [FreeTextEntry1] : HARSHA MILES is an 77 year with Hypertension, Type 2 Diabetes, Dyslipidemia, Atrial Fibrillation, CHFpEF, CKD stage 3, Liver Cirrhosis, Carpal tunnel, PPM, Insomnia, Cardiac ablation, Right Breast Cancer, Osteoarthritis, bradycardia, hypotension  HARSHA MILES  is being seen after discharge home from [facility] for chest pain, palpitation, gout arthritis. HARSHA MILES  was admitted on 11/15/24  and was discharge home on 11/21/24 .   Discharge medications were reconciled with current medication list. Stop: -Ramipril 5mg daily  Start: -Methylprednisolone 8mg tab, take 2 tabs daily for 3days -Methylprednisolone 8mg tab, take 2 tabs daily for 2days Patient reports Methylprednisolone 8mgs for 2 days was not send to pharmacy by hospital. Medication order send to pharmacy.

## 2025-01-07 NOTE — REVIEW OF SYSTEMS
[Back Pain] : back pain [Unsteady Walking] : ataxia [Insomnia] : insomnia [Negative] : Heme/Lymph [Joint Pain] : no joint pain [Joint Stiffness] : no joint stiffness [Joint Swelling] : no joint swelling [Muscle Weakness] : no muscle weakness [Muscle Pain] : no muscle pain [Itching] : no itching [Mole Changes] : no mole changes [Nail Changes] : no nail changes [Hair Changes] : no hair changes [Skin Rash] : no skin rash [Suicidal] : not suicidal [Anxiety] : no anxiety [Depression] : no depression [FreeTextEntry9] : lower back pain [de-identified] : incision site dry and clean on RUQ [de-identified] : severe insomnia

## 2025-03-18 NOTE — HISTORY OF PRESENT ILLNESS
[In-Place] : has aide services in-place [House Calls Co-Management Patient] : [unfilled] is a House Calls co-management patient [Patient is stable] : patient is stable [Education provided] : education provided [Patient] : patient [LastPVisitDate] : 12/24 [FreeTextEntry4] : cardiology, endocrinology, GI, nephrology, oncology [FreeTextEntry1] : OA, chronic pain syndrome [FreeTextEntry2] : 03/17/2025 COVID SCREEN: Patient or caretaker denies fever, cough, trouble breathing, rash, vomiting. Patient has not been in close contact with anyone who is COVID-19 positive or suspected of having COVID-19.  N95 mask, gloves, eye wear and gown (if indicated) used during visit: Yes.  Total face to face time with patient is 45 min.  HARSHA MILES is a 77-year-old female with Hypertension, Type 2 Diabetes, Dyslipidemia, Atrial Fibrillation, CHFpEF, CKD stage 3,  Liver Cirrhosis, Carpal tunnel, PPM, Insomnia, Cardiac ablation, Right Breast Cancer, Osteoarthritis, bradycardia, hypotension, s/p renal biopsy and renal ablation seen today for follow up home visit.  Accompanied the visit was HHA  Patient was primary historian Today's Visit and Review 03/17/2025: - Seen today for follow-up, doing well overall.   - Reports recurrent palpitations, described as extra beats. Attributed to atrial fibrillation. Denies chest pain but notes occasional chest tightness with episodes of palpitation.  - Palpitations are worse when lying down, prevent patient from sleeping well.  - Plans to follow with cardiology next week for routine defibrillator check.    - Pt is planned for atrial fibrillation ablation in two months.   - Notes dizziness with episodes of palpitations.   - Pt with insomnia, worse with nighttime palpitations.   - Body rash appreciated on today's visit. Reports that rash has been present for a month, described as itchy.  - Pt denies any changes in skin care or hygiene products that could be related to rash.    -  Provider to order topical steroid cream and Benadryl cream to be used PRN.   - Pt trying to get motorized wheelchair.   - Denies any hospitalizations since last visit.  - Provider to order lancets and alcohol swabs.    - Medications reviewed. No changes in medications since last visit.    Geriatric Assessment: -Appetite/weight: reportedly good   -Gait/falls: none reported  -Pain: none reported   -Sleep: poor sleep due to palpitations, insomnia -BMs: regular, no constipation/diarrhea, continent  -Urine: no pain, no frequent/urgency, no cloudy/blood in urine, continent  -Skin: body rash  -DME: rollator, walker, cane -Mood/memory: No depressed, alert, oriented, speech deficit  -Communication: conversational  Healthcare Maintenance:  vaccination UTD  Hospitalization:    -[facility] admission on 12/31 for complications status-post liver biopsy and ablation. Discharged home on 01/03/25 # 11/15/24 [facility] for chest pain, palpitation, gout arthritis. Patient was discharge home on 11/21/24. #Admission to [facility] ([facility]) on 12/26 for hypotension, cough. Primary Dx bradycardia, DESMOND with COVID-19.  Patient reports no weight loss >10 lbs in the past 6 months. No changes in dentition or swallowing reported, No changes in hearing or vision reported. No changes in Cognition reported. Patient denies any symptoms of depression or anxiety. Patient is ADL independent/dependent and IADL independent/dependent.   Patient's home environment is safe.   Goals of care discussed 20min- FULL CODE, no limitation  Discussion of advance care planning for HARSHA MILES. Total Time Spent: 20 minutes Participants: Patient  Discussion: Voluntary Patient clearly state wishes Prognosis: Guarded Completed Forms:  MOLST not completed, Goal of Care Summary:  Full code,  Artificial feeding or hydration, determine use of antibiotics, hospital, dialysis

## 2025-03-18 NOTE — HEALTH RISK ASSESSMENT
[Independent] : managing finances [Some assistance needed] : using transportation [No falls in past year] : Patient reported no falls in the past year [No] : The patient does not have visual impairment [HRA Reviewed] : Health risk assessment reviewed [TimeGetUpGo] : 4 [de-identified] : participated with unsteady gait

## 2025-03-18 NOTE — REVIEW OF SYSTEMS
[Palpitations] : palpitations [Joint Pain] : joint pain [Back Pain] : back pain [Itching] : Itching [Skin Rash] : skin rash [Unsteady Walking] : ataxia [Insomnia] : insomnia [Negative] : Heme/Lymph [Chest Pain] : no chest pain [Leg Claudication] : no leg claudication [Lower Ext Edema] : no lower extremity edema [Orthopnea] : no orthopnea [Paroxysmal Nocturnal Dyspnea] : no paroxysmal nocturnal dyspnea [Joint Stiffness] : no joint stiffness [Joint Swelling] : no joint swelling [Muscle Weakness] : no muscle weakness [Muscle Pain] : no muscle pain [Mole Changes] : no mole changes [Nail Changes] : no nail changes [Hair Changes] : no hair changes [Headache] : no headache [Dizziness] : no dizziness [Fainting] : no fainting [Confusion] : no confusion [Memory Loss] : no memory loss [Suicidal] : not suicidal [Anxiety] : no anxiety [Depression] : no depression [FreeTextEntry5] : intermittent palpitation [FreeTextEntry9] : lower back pain [de-identified] : red raised rash on upper extremities and back [de-identified] : severe insomnia

## 2025-03-18 NOTE — ADDENDUM
[FreeTextEntry1] :   Fauzia CORDERO assisted in documentation on 03/18/2025 acting as a scribe for JOANNE Kilgore. 
Detail Level: Zone

## 2025-03-18 NOTE — CHRONIC CARE ASSESSMENT
[Current] : Current Pain: [Oriented To Person] : ~L oriented to person [Oriented To Place] : ~L oriented to place [Oriented To Time] : ~L oriented to time [Oriented To Situation] : ~L oriented to situation [Alert] : ~L alert [Disorientation] : no disorientation was observed [Non-responsive] : responsiveness  [Lethargic] : not lethargic [Stupor] : no stupor [No Action Needed] : No action needed [Reviewed depression screen from comprehensive assessment] : Reviewed depression screen from comprehensive assessment [Over the Past 2 Weeks, Have You Felt Down, Depressed, or Hopeless?] : 1.) Over the past 2 weeks, have you felt down, depressed, or hopeless? No [Over the Past 2 Weeks, Have You Felt Little Interest or Pleasure Doing Things?] : 2.) Over the past 2 weeks, have you felt little interest or pleasure doing things? No [Reviewed Home Safety Evaluation] : Reviewed home safety evaluation [Reports changes in hearing] : reports no changes in hearing [Reports changes in vision] : Reports no changes in vision [de-identified] : 1/10 [___ Times Per Week] : exercises [unfilled] times per week [Walking] : walking [Diabetic Diet] : diabetic [Low Fat Diet] : low fat [Low Carb Diet] : low carbohydrate [Low Salt Diet] : low salt [General Adherence] : and is generally adherent [PPS Score: ____] : Palliative Performance Scale (PPS) Score: [unfilled] [de-identified] : Extensive cardiac history, CKD, DM2, liver cirrhosis  [de-identified] : walks daily with cane in house and use wheelchair in community [de-identified] : Low sodium, low fat, no concentrated sweets, diabetic, low carb, renal diet [FreeTextEntry1] : renal diet

## 2025-03-18 NOTE — COUNSELING
[] : foot exam [Not Recommended] : Aspirin use not recommended due to overall prognosis [Full Code] : Code Status: Full Code [No Limitations] : Treatment Guidelines: No limitations [Long Term Intubation] : Intubation: Long term intubation [Obese (BMI >29.9)] : Obese - BMI >29.9 [DASH diet given] : DASH diet given [Obese -  ( BMI  >29.9 )] : obese - ( BMI  >29.9 ) [Weight counseling provided] : weight counseling provided [DASH diet recommended] : DASH diet recommended [Sodium restriction 2gm recommended] : sodium restriction 2 gm recommended [Non - Smoker] : non-smoker [Smoke/CO Detectors] : smoke/CO detectors [Use grab bars] : use grab bars [Medical alert] : medical alert [Use assistive device to avoid falls] : use assistive device to avoid falls [Remove clutter and unsafe carpeting to avoid falls] : remove clutter and unsafe carpeting to avoid falls [Patient not on disease-modifying anti-rheumatic drug due to overall prognosis] : Patient not on disease-modifying anti-rheumatic drug due to overall prognosis [Improve mobility] : improve mobility [Improve pain control] : improve pain control [Minimize unnecessary interventions] : minimize unnecessary interventions [Discussed disease trajectory with patient/caregiver] : discussed disease trajectory with patient/caregiver [Advanced Directives discussed: ____] : Advanced directives discussed: [unfilled] [Annual Discussion and review of: ___] : Annual discussion and review of [unfilled] [FreeTextEntry3] : renal diet

## 2025-03-18 NOTE — PHYSICAL EXAM
[No Acute Distress] : no acute distress [Normal Voice/Communication] : normal voice communication [Normal Sclera/Conjunctiva] : normal sclera/conjunctiva [EOMI] : extra ocular movement intact [Normal Outer Ear/Nose] : the ears and nose were normal in appearance [Normal TMs] : both tympanic membranes were normal [No JVD] : no jugular venous distention [No LAD] : no lymphadenopathy [No Respiratory Distress] : no respiratory distress [Clear to Auscultation] : lungs were clear to auscultation bilaterally [No Accessory Muscle Use] : no accessory muscle use [Normal Rate] : heart rate was normal  [Normal S1, S2] : normal S1 and S2 [No Murmurs] : no murmurs heard [No Edema] : there was no peripheral edema [Normal Bowel Sounds] : normal bowel sounds [Non Tender] : non-tender [Soft] : abdomen soft [Not Distended] : not distended [Normal Post Cervical Nodes] : no posterior cervical lymphadenopathy [Normal Anterior Cervical Nodes] : no anterior cervical lymphadenopathy [No Joint Swelling] : no joint swelling seen [Cranial Nerves Intact] : cranial nerves 2-12 were intact [Oriented x3] : oriented to person, place, and time [Over the Past 2 Weeks, Have You Felt Down, Depressed, or Hopeless?] : 1.) Over the past 2 weeks, have you felt down, depressed, or hopeless? No [Over the Past 2 Weeks, Have You Felt Little Interest or Pleasure Doing Things?] : 2.) Over the past 2 weeks, have you felt little interest or pleasure doing things? No [Foot Ulcers] : no foot ulcers [de-identified] : using glasses [de-identified] : obese [de-identified] : red raised rash on upper extremities and back

## 2025-03-18 NOTE — REASON FOR VISIT
[Follow-Up] : a follow-up visit [Formal Caregiver] : formal caregiver [Other: _____] : [unfilled] [Pre-Visit Preparation] : pre-visit preparation was done [Intercurrent Specialty/Sub-specialty Visits] : the patient has intercurrent specialty/sub-specialty visits [FreeTextEntry1] : Type 2 Diabetes, Atrial Fibrillation, CHFpEF, CKD stage 3,  Liver Cirrhosis, PPM, Right Breast Cancer, Osteoarthritis,            [FreeTextEntry2] : chart reviewed [FreeTextEntry3] : cardiology, endocrinology, GI, oncology, nephrology

## 2025-04-16 NOTE — HISTORY OF PRESENT ILLNESS
[FreeTextEntry1] : 77y F w/ hx of HTN, DM, CKD stage 3, A-fib, former smoker 1 ppd for 35years and CHF presents today for follow up today. At the present denies active complaints such as CP, SOB, N/V/D, dizziness, lightheadedness, dysuria, hematuria, fever or chills. No recent visits to the adult ER or hospital admissions reported in the last 2 weeks. No new medications started in the last week either.  With regards to renal function, new labs showed SCr at 1.06 with GR at 53% and old cystatin C levels at 1.51 with eGFR by cystatin C at 39%. No electrolyte imbalances noted.  PCP: Phil Maria

## 2025-04-16 NOTE — ASSESSMENT
[FreeTextEntry1] : 1. CKD stage III likely related to HTN associated nephrosclerosis vs DM vs CRS vs recurrent episodes of DESMOND -  new labs showed SCr at 1.06 with GR at 53% and old cystatin C levels at 1.51 with eGFR by cystatin C at 39%.  No electrolyte imbalances noted. Advised and educated on CVD risk factors Advised to avoid NSAIDS Advised and educated to adjust all meds to GFR< 35% Advised to maintain strict BP control and DM control Old UA bland Will order new BMP, UA, UAC, UPC, cystatin C levels, before next visit New order for Renal US given again  2. HTN- BP at goal today C/w BP regiment as follows: metoprolol 200mg Po AM and 50mg PO PM, furosemide 40mg bid (PRN) and entresto bid  compliance with BP meds and lifestyle modifications recommended. c/w low salt diet c/w BP monitoring at home  3. DM - patient already on SGLT2-i farxiga 10mg PO daily c/w strict glycemic control by PCP  RTC w/ Labs in6m.

## 2025-04-16 NOTE — PHYSICAL EXAM
[General Appearance - Alert] : alert [Strabismus] : no strabismus was seen [Hearing Threshold Finger Rub Not [place]] : hearing was normal [Neck Cervical Mass (___cm)] : no neck mass was observed [Jugular Venous Distention Increased] : there was no jugular-venous distention [Exaggerated Use Of Accessory Muscles For Inspiration] : no accessory muscle use [Heart Sounds] : normal S1 and S2 [Heart Sounds Gallop] : no gallops [Murmurs] : no murmurs [Bowel Sounds] : normal bowel sounds [Abdomen Soft] : soft [Abdomen Tenderness] : non-tender [No CVA Tenderness] : no ~M costovertebral angle tenderness [] : no rash [Skin Lesions] : no skin lesions [No Focal Deficits] : no focal deficits [Oriented To Time, Place, And Person] : oriented to person, place, and time

## 2025-04-30 NOTE — ADDENDUM
[FreeTextEntry1] :   Fauzia CORDERO assisted in documentation on 04/30/2025 acting as a scribe for JOANNE Kilgore.

## 2025-04-30 NOTE — HEALTH RISK ASSESSMENT
[Independent] : managing finances [Some assistance needed] : using transportation [No falls in past year] : Patient reported no falls in the past year [No] : The patient does not have visual impairment [HRA Reviewed] : Health risk assessment reviewed [TimeGetUpGo] : 6 [de-identified] : participated with unsteady gait

## 2025-04-30 NOTE — CHRONIC CARE ASSESSMENT
[___ Times Per Week] : exercises [unfilled] times per week [General Adherence] : and is generally adherent [Walking] : walking [Diabetic Diet] : diabetic [Low Fat Diet] : low fat [Low Carb Diet] : low carbohydrate [Low Salt Diet] : low salt [PPS Score: ____] : Palliative Performance Scale (PPS) Score: [unfilled] [de-identified] : Extensive cardiac history (Palpitation,  Atrial Fibrillation, CHFpEF, PPM, Cardiac ablation, bradycardia, hypotension), CKD stage 3, Liver Cirrhosis, Carpal tunnel, Insomnia, Back and neck pain, OA [de-identified] : walks daily with cane in house and use wheelchair in community [de-identified] : Low sodium, low fat, no concentrated sweets, diabetic, low carb, renal diet [FreeTextEntry1] : renal diet

## 2025-04-30 NOTE — PHYSICAL EXAM
[Over the Past 2 Weeks, Have You Felt Down, Depressed, or Hopeless?] : 1.) Over the past 2 weeks, have you felt down, depressed, or hopeless? No [Over the Past 2 Weeks, Have You Felt Little Interest or Pleasure Doing Things?] : 2.) Over the past 2 weeks, have you felt little interest or pleasure doing things? No [Foot Ulcers] : no foot ulcers [de-identified] : using glasses [de-identified] : obese [de-identified] : red raised rash on upper extremities and back

## 2025-04-30 NOTE — COUNSELING
[] : foot exam [Not Recommended] : Aspirin use not recommended due to overall prognosis [Full Code] : Code Status: Full Code [No Limitations] : Treatment Guidelines: No limitations [Long Term Intubation] : Intubation: Long term intubation [FreeTextEntry3] : renal diet

## 2025-04-30 NOTE — HEALTH RISK ASSESSMENT
[Independent] : managing finances [Some assistance needed] : using transportation [No falls in past year] : Patient reported no falls in the past year [No] : The patient does not have visual impairment [HRA Reviewed] : Health risk assessment reviewed [TimeGetUpGo] : 6 [de-identified] : participated with unsteady gait

## 2025-04-30 NOTE — REVIEW OF SYSTEMS
[Joint Stiffness] : no joint stiffness [Joint Swelling] : no joint swelling [Muscle Weakness] : no muscle weakness [Muscle Pain] : no muscle pain [Mole Changes] : no mole changes [Nail Changes] : no nail changes [Hair Changes] : no hair changes [Headache] : no headache [Dizziness] : no dizziness [Fainting] : no fainting [Confusion] : no confusion [Memory Loss] : no memory loss [Suicidal] : not suicidal [Anxiety] : no anxiety [Depression] : no depression [FreeTextEntry9] : lower back pain, neck pain  [de-identified] : red raised rash on upper extremities and back

## 2025-04-30 NOTE — CHRONIC CARE ASSESSMENT
[___ Times Per Week] : exercises [unfilled] times per week [General Adherence] : and is generally adherent [Walking] : walking [Diabetic Diet] : diabetic [Low Fat Diet] : low fat [Low Carb Diet] : low carbohydrate [Low Salt Diet] : low salt [PPS Score: ____] : Palliative Performance Scale (PPS) Score: [unfilled] [de-identified] : Extensive cardiac history (Palpitation,  Atrial Fibrillation, CHFpEF, PPM, Cardiac ablation, bradycardia, hypotension), CKD stage 3, Liver Cirrhosis, Carpal tunnel, Insomnia, Back and neck pain, OA [de-identified] : walks daily with cane in house and use wheelchair in community [de-identified] : Low sodium, low fat, no concentrated sweets, diabetic, low carb, renal diet [FreeTextEntry1] : renal diet

## 2025-04-30 NOTE — PHYSICAL EXAM
[Over the Past 2 Weeks, Have You Felt Down, Depressed, or Hopeless?] : 1.) Over the past 2 weeks, have you felt down, depressed, or hopeless? No [Over the Past 2 Weeks, Have You Felt Little Interest or Pleasure Doing Things?] : 2.) Over the past 2 weeks, have you felt little interest or pleasure doing things? No [Foot Ulcers] : no foot ulcers [de-identified] : using glasses [de-identified] : obese [de-identified] : red raised rash on upper extremities and back

## 2025-04-30 NOTE — HISTORY OF PRESENT ILLNESS
[In-Place] : has aide services in-place [House Calls Co-Management Patient] : [unfilled] is a House Calls co-management patient [Patient is stable] : patient is stable [Education provided] : education provided [Patient] : patient [LastPVisitDate] : 12/24 [FreeTextEntry4] : cardiology, endocrinology, GI, nephrology, oncology [FreeTextEntry1] : OA, chronic pain syndrome, palpitation, Atrial Fibrillation, CHFpEF,  [FreeTextEntry2] : 04/30/2025 COVID SCREEN: Patient or caretaker denies fever, cough, trouble breathing, rash, vomiting. Patient has not been in close contact with anyone who is COVID-19 positive or suspected of having COVID-19.  N95 mask, gloves, eye wear and gown (if indicated) used during visit: Yes.  Total face to face time with patient is 45 min.  HARSHA MILES is a 77-year-old female with Hypertension, Type 2 Diabetes, Dyslipidemia, Atrial Fibrillation, CHFpEF, CKD stage 3, Liver Cirrhosis, Carpal tunnel, PPM, Insomnia, Cardiac ablation, Right Breast Cancer, Osteoarthritis, bradycardia, hypotension, s/p renal biopsy and renal ablation seen today for follow up home visit.  Patient was alone for visit.    Patient was primary historian  Today's Visit and Review 04/30/2025:  - Seen today for follow-up, doing well overall.  - Mentions BP was elevated on last self-measurement. However, BP within normal limits during visit.   - Reports back and neck pain, managed poorly with Lidocaine patches and heating pad. Pt previously tried Tramadol but developed side effects.  On Oxycodone and asking for refill.  - Previously received trigger point injections for pain management with noted benefit.   - Plan to follow with pain management on 05/13/2025.  - Provider to submit PT referral for pain management.   - Notes recurrent and itchy body rash. Per pt, rash spreads and worsens when scratched. Denies any dietary changes or new products that could be related to outbreak.      - Discussed that rash is possibly due to renal issues.   - Currently using topical steroid with temporary relief, states that rash recurs despite use of steroid cream.   - Scheduled to follow with dermatologist on 05/20/2025.    - Patient planning to go for cardiac ablation for ongoing and worsening palpitations.   - Scheduled for liver MRI.   - Medications reviewed and reconciled during visit.    Geriatric Assessment: -Appetite/weight: reportedly good    -Gait/falls: none reported , unsteady gait -Pain: neck and back pain, taking oxycodone and topical cream -Sleep: poor sleep due to palpitations, insomnia  -BMs: regular, no constipation/diarrhea, continent   -Urine: no pain, no frequent/urgency, no cloudy/blood in urine, continent  -Skin: body rash  -DME: rollator, walker, cane -Mood/memory: No depressed, alert, oriented -Communication: conversational  Healthcare Maintenance:  vaccination UTD  Patient reports no weight loss >10 lbs in the past 6 months. No changes in dentition or swallowing reported, No changes in hearing or vision reported. No changes in Cognition reported. Patient denies any symptoms of depression or anxiety. Patient is ADL independent/dependent and IADL independent/dependent.   Patient's home environment is safe.   Goals of care discussed 10min- FULL CODE, no limitation

## 2025-04-30 NOTE — REASON FOR VISIT
[Follow-Up] : a follow-up visit [Pre-Visit Preparation] : pre-visit preparation was done [Intercurrent Specialty/Sub-specialty Visits] : the patient has intercurrent specialty/sub-specialty visits [FreeTextEntry1] : Type 2 Diabetes, Atrial Fibrillation, CHFpEF, CKD stage 3,  Liver Cirrhosis, PPM, Right Breast Cancer, Osteoarthritis,            [FreeTextEntry2] : chart reviewed [FreeTextEntry3] : cardiology, endocrinology, GI, oncology, nephrology

## 2025-04-30 NOTE — REVIEW OF SYSTEMS
[Joint Stiffness] : no joint stiffness [Joint Swelling] : no joint swelling [Muscle Weakness] : no muscle weakness [Muscle Pain] : no muscle pain [Mole Changes] : no mole changes [Nail Changes] : no nail changes [Hair Changes] : no hair changes [Headache] : no headache [Dizziness] : no dizziness [Fainting] : no fainting [Confusion] : no confusion [Memory Loss] : no memory loss [Suicidal] : not suicidal [Anxiety] : no anxiety [Depression] : no depression [FreeTextEntry9] : lower back pain, neck pain  [de-identified] : red raised rash on upper extremities and back